# Patient Record
Sex: FEMALE | Race: BLACK OR AFRICAN AMERICAN | Employment: FULL TIME | URBAN - METROPOLITAN AREA
[De-identification: names, ages, dates, MRNs, and addresses within clinical notes are randomized per-mention and may not be internally consistent; named-entity substitution may affect disease eponyms.]

---

## 2019-12-24 ENCOUNTER — HOSPITAL ENCOUNTER (EMERGENCY)
Age: 59
Discharge: HOME | End: 2019-12-24
Attending: EMERGENCY MEDICINE
Payer: COMMERCIAL

## 2019-12-24 VITALS
BODY MASS INDEX: 29.44 KG/M2 | OXYGEN SATURATION: 96 % | DIASTOLIC BLOOD PRESSURE: 94 MMHG | HEART RATE: 86 BPM | WEIGHT: 160 LBS | TEMPERATURE: 98.5 F | SYSTOLIC BLOOD PRESSURE: 128 MMHG | RESPIRATION RATE: 18 BRPM | HEIGHT: 62 IN

## 2019-12-24 DIAGNOSIS — K04.7 DENTAL INFECTION: Primary | ICD-10-CM

## 2019-12-24 PROCEDURE — 74011250637 HC RX REV CODE- 250/637: Performed by: PHYSICIAN ASSISTANT

## 2019-12-24 PROCEDURE — 74011000250 HC RX REV CODE- 250: Performed by: PHYSICIAN ASSISTANT

## 2019-12-24 PROCEDURE — 99283 EMERGENCY DEPT VISIT LOW MDM: CPT

## 2019-12-24 RX ORDER — IBUPROFEN 800 MG/1
800 TABLET ORAL
Qty: 20 TAB | Refills: 0 | Status: SHIPPED | OUTPATIENT
Start: 2019-12-24 | End: 2019-12-31

## 2019-12-24 RX ORDER — CLINDAMYCIN HYDROCHLORIDE 150 MG/1
300 CAPSULE ORAL
Status: COMPLETED | OUTPATIENT
Start: 2019-12-24 | End: 2019-12-24

## 2019-12-24 RX ORDER — CLINDAMYCIN HYDROCHLORIDE 300 MG/1
300 CAPSULE ORAL 4 TIMES DAILY
Qty: 28 CAP | Refills: 0 | Status: SHIPPED | OUTPATIENT
Start: 2019-12-24 | End: 2019-12-31

## 2019-12-24 RX ORDER — HYDROCODONE BITARTRATE AND ACETAMINOPHEN 5; 325 MG/1; MG/1
1 TABLET ORAL
Qty: 10 TAB | Refills: 0 | Status: SHIPPED | OUTPATIENT
Start: 2019-12-24 | End: 2019-12-27

## 2019-12-24 RX ADMIN — DIPHENHYDRAMINE HYDROCHLORIDE: 12.5 LIQUID ORAL at 12:30

## 2019-12-24 RX ADMIN — CLINDAMYCIN HYDROCHLORIDE 300 MG: 150 CAPSULE ORAL at 12:54

## 2019-12-24 NOTE — ED NOTES
Pt presents ambulatory to ED complaining of upper left dental pain x1 week. Pt reports tooth broke and now left cheek is swollen. Pt reports taking dicolfenac and motrin for pain. Pt is alert and oriented x 4, RR even and unlabored, skin is warm and dry. Assesment completed and pt updated on plan of care. Emergency Department Nursing Plan of Care       The Nursing Plan of Care is developed from the Nursing assessment and Emergency Department Attending provider initial evaluation. The plan of care may be reviewed in the ED Provider note.     The Plan of Care was developed with the following considerations:   Patient / Family readiness to learn indicated by:verbalized understanding  Persons(s) to be included in education: patient  Barriers to Learning/Limitations:No    [address] 12/24/2019   12:23 PM

## 2019-12-24 NOTE — ED PROVIDER NOTES
EMERGENCY DEPARTMENT HISTORY AND PHYSICAL EXAM      Date: 12/24/2019  Patient Name: Ani Santamaria    History of Presenting Illness     HPI: Ani Santamaria is a 61 y.o. female with no significant past medical history presents to the emergency room for dental pain for 2 days. She reports that 2 days ago she fractured a tooth and says that she had intermittent mild pain but this morning she woke up with constant 9 out of 10 throbbing pain associated with left-sided facial swelling. She denies trismus, shortness of breath, drooling, voice change, among other associated symptoms. Pertinent social history: Current smoker, occasional drinker, denies street drug use    Pertinent surgical history: None    PCP: Jack, Not On File    Current Outpatient Medications   Medication Sig Dispense Refill    clindamycin (CLEOCIN) 300 mg capsule Take 1 Cap by mouth four (4) times daily for 7 days. 28 Cap 0    HYDROcodone-acetaminophen (NORCO) 5-325 mg per tablet Take 1 Tab by mouth every six (6) hours as needed for Pain for up to 3 days. Max Daily Amount: 4 Tabs. 10 Tab 0    ibuprofen (MOTRIN) 800 mg tablet Take 1 Tab by mouth every six (6) hours as needed for Pain for up to 7 days. 20 Tab 0       Past History     Past Medical History:  History reviewed. No pertinent past medical history. Past Surgical History:  History reviewed. No pertinent surgical history. Family History:  History reviewed. No pertinent family history. Social History:  Social History     Tobacco Use    Smoking status: Current Every Day Smoker    Smokeless tobacco: Never Used    Tobacco comment: 2-3 cigarettes daily   Substance Use Topics    Alcohol use: Yes     Comment: socially    Drug use: Not Currently       Allergies: Allergies   Allergen Reactions    Penicillins Swelling         Review of Systems   Review of Systems   Constitutional: Negative for chills and fever. HENT: Positive for congestion, dental problem and facial swelling. Negative for ear discharge, ear pain, sore throat, trouble swallowing and voice change. Respiratory: Negative for shortness of breath. Cardiovascular: Negative for chest pain. Gastrointestinal: Negative for nausea and vomiting. Neurological: Negative for light-headedness and headaches. Physical Exam     Vitals:    12/24/19 1202   BP: (!) 128/94   Pulse: 86   Resp: 18   Temp: 98.5 °F (36.9 °C)   SpO2: 96%   Weight: 72.6 kg (160 lb)   Height: 5' 2\" (1.575 m)     Physical Exam  Vitals signs and nursing note reviewed. Constitutional:       General: She is not in acute distress. Appearance: She is well-developed. She is not ill-appearing or diaphoretic. HENT:      Head: Normocephalic and atraumatic. Right Ear: Tympanic membrane, ear canal and external ear normal.      Left Ear: Tympanic membrane, ear canal and external ear normal.      Nose: Congestion present. Mouth/Throat:      Lips: No lesions. Mouth: Mucous membranes are moist.      Dentition: Abnormal dentition. Gingival swelling and dental caries present. Tongue: No lesions. Palate: No mass. Pharynx: Oropharynx is clear. Uvula midline. No pharyngeal swelling, oropharyngeal exudate or posterior oropharyngeal erythema. Tonsils: No tonsillar exudate or tonsillar abscesses. Neck:      Musculoskeletal: Normal range of motion and neck supple. Cardiovascular:      Rate and Rhythm: Normal rate and regular rhythm. Heart sounds: Normal heart sounds. Pulmonary:      Effort: Pulmonary effort is normal. No respiratory distress. Breath sounds: Normal breath sounds. No wheezing. Lymphadenopathy:      Cervical: No cervical adenopathy. Skin:     General: Skin is warm and dry. Coloration: Skin is not pale. Findings: No erythema or rash. Neurological:      Mental Status: She is alert and oriented to person, place, and time.    Psychiatric:         Behavior: Behavior normal.         Thought Content: Thought content normal.         Judgment: Judgment normal.           Diagnostic Study Results     Labs -   No results found for this or any previous visit (from the past 12 hour(s)). Radiologic Studies -   No orders to display     CT Results  (Last 48 hours)    None                Medical Decision Making   I am the first provider for this patient. I reviewed the vital signs, available nursing notes, past medical history, past surgical history, social history    ED Course and Progress notes:   Initial assessment performed. The patients presenting problems have been discussed, and they are in agreement with the care plan formulated and outlined with them. I have encouraged them to ask questions as they arise throughout their visit. on re evaluation pt is resting comfortably, and has no new complaints, changes, or physical findings. The patient has improved and is stable. Procedures:  Procedures    Critical Care Time: none    Vital Signs-Reviewed the patient's vital signs. Vitals:    12/24/19 1202   BP: (!) 128/94   BP 1 Location: Left arm   BP Patient Position: At rest   Pulse: 86   Resp: 18   Temp: 98.5 °F (36.9 °C)   SpO2: 96%   Weight: 72.6 kg (160 lb)   Height: 5' 2\" (1.575 m)       Medications Administered During ED Course  Medications   dental ball (lidocaine/Benadryl/Cetacaine) mixture ( Mucous Membrane Given 12/24/19 1230)   clindamycin (CLEOCIN) capsule 300 mg (300 mg Oral Given 12/24/19 1254)       Disposition:  D/c home    DISCHARGE NOTE:   The patient was counseled on diagnosis and care plan. All available lab and imaging results have been reviewed and were discussed with the patient, including all incidental findings. The likelihood of other entities in the differential is insufficient to justify any further testing for them. This was explained to the patient.   Patient agrees with plan and agrees to follow up with PCP as recommended, or return to the ED immediately if their symptoms worsen. All medications were reviewed with the patient. All of pt's questions and concerns were addressed. The patient was advised that new or worsening symptoms would require further evaluation and should prompt immediate return to the Emergency Department. Discharge instructions have been provided and explained to the patient, along with reasons to return to the ED. Patient voices understanding and is agreeable with the plan for discharge. Patient is ready to go home. Follow-up Information     Follow up With Specialties Details Why [address] [facility] Dentistry Schedule an appointment as soon as possible for a visit  [address]  [address] [facility] Emergency Medicine Go to If symptoms worsen [address]  Schedule an appointment as soon as possible for a visit  [address]  [phone]          Discharge Medication List as of 12/24/2019 12:49 PM      START taking these medications    Details   clindamycin (CLEOCIN) 300 mg capsule Take 1 Cap by mouth four (4) times daily for 7 days. , Print, Disp-28 Cap, R-0      HYDROcodone-acetaminophen (NORCO) 5-325 mg per tablet Take 1 Tab by mouth every six (6) hours as needed for Pain for up to 3 days. Max Daily Amount: 4 Tabs., Print, Disp-10 Tab, R-0      ibuprofen (MOTRIN) 800 mg tablet Take 1 Tab by mouth every six (6) hours as needed for Pain for up to 7 days. , Print, Disp-20 Tab, R-0         STOP taking these medications       DICLOFENAC SODIUM PO Comments:   Reason for Stopping:               Provider Notes (Medical Decision Making):   DDX: gingivitis, abscess, poor oral hygiene, fractured tooth, dentalgia, No red flags that make peritonsillar/retropharyngeal abscess, ludwigs angina, carotid artery dissection concerning. Diagnosis     Clinical Impression:   1.  Dental infection Please note that this dictation was completed with [email or web address], the computer voice recognition software. Quite often unanticipated grammatical, syntax, homophones, and other interpretive errors are inadvertently transcribed by the computer software. Please disregard these errors. Please excuse any errors that have escaped final proofreading. This note will not be viewable in [address]

## 2019-12-24 NOTE — ED TRIAGE NOTES
Pt c/o L upper dental pain x few weeks, worsening last night. Pt cheek slightly swollen. Pt is concerned and would like anitbiotics Pt has been taking ibuprofen and diclofenac for pain.

## 2019-12-24 NOTE — ED NOTES
Discharge instructions were given to the patient by Julianna Jett.     The patient left the Emergency Department ambulatory, alert and oriented and in no acute distress with 3 prescriptions. The patient was encouraged to call or return to the ED for worsening issues or problems and was encouraged to schedule a follow up appointment for continuing care. The patient verbalized understanding of discharge instructions and prescriptions, all questions were answered.  The patient has no further concerns at this time.      '

## 2019-12-24 NOTE — LETTER
[facility] EMERGENCY DEPT 
[address] [address] 
[phone] Work/School Note Date: 12/24/2019 To Whom It May concern: 
 
Jean Fontenot was seen and treated today in the emergency room by the following provider(s): 
Attending Provider: Maine Christie MD 
Physician Assistant: Priscilla Ortiz, [facility]. Jean Fontenot may return to work on Saturday Sincerely, 
 
 
 
 
JULIA Calixto

## 2019-12-24 NOTE — DISCHARGE INSTRUCTIONS
Patient Education        Abscessed Tooth: Care Instructions  Your Care Instructions    An abscessed tooth is a tooth that has a pocket of pus in the tissues around it. Pus forms when the body tries to fight an infection caused by bacteria. If the pus cannot drain, it forms an abscess. An abscessed tooth can cause red, swollen gums and throbbing pain, especially when you chew. You may have a bad taste in your mouth and a fever, and your jaw may swell. Damage to the tooth, untreated tooth decay, or gum disease can cause an abscessed tooth. An abscessed tooth needs to be treated by a dental professional right away. If it is not treated, the infection could spread to other parts of your body. Your dentist will give you antibiotics to stop the infection. He or she may make a hole in the tooth or cut open (aki) the abscess inside your mouth so that the infection can drain, which should relieve your pain. You may need to have a root canal treatment, which tries to save your tooth by taking out the infected pulp and replacing it with a healing medicine and/or a filling. If these treatments do not work, your tooth may have to be removed. Follow-up care is a key part of your treatment and safety. Be sure to make and go to all appointments, and call your doctor if you are having problems. It's also a good idea to know your test results and keep a list of the medicines you take. How can you care for yourself at home? · Reduce pain and swelling in your face and jaw by putting ice or a cold pack on the outside of your cheek for 10 to 20 minutes at a time. Put a thin cloth between the ice and your skin. · Take pain medicines exactly as directed. ? If the doctor gave you a prescription medicine for pain, take it as prescribed. ? If you are not taking a prescription pain medicine, ask your doctor if you can take an over-the-counter medicine. · Take your antibiotics as directed.  Do not stop taking them just because you feel better. You need to take the full course of antibiotics. To prevent tooth abscess  · Brush and floss every day, and have regular dental checkups. · Eat a healthy diet, and avoid sugary foods and drinks. · Do not smoke, use e-cigarettes with nicotine, or use spit tobacco. Tobacco and nicotine slow your ability to heal. Tobacco also increases your risk for gum disease and cancer of the mouth and throat. If you need help quitting, talk to your doctor about stop-smoking programs and medicines. These can increase your chances of quitting for good. When should you call for help? Call 911 anytime you think you may need emergency care. For example, call if:    · You have trouble breathing.    Call your doctor now or seek immediate medical care if:    · You have new or worse symptoms of infection, such as:  ? Increased pain, swelling, warmth, or redness. ? Red streaks leading from the area. ? Pus draining from the area. ? A fever.    Watch closely for changes in your health, and be sure to contact your doctor if:    · You do not get better as expected. Where can you learn more? Go to http://pepito-julius.info/. Enter E779 in the search box to learn more about \"Abscessed Tooth: Care Instructions. \"  Current as of: October 3, 2018  Content Version: 12.2  © 6279-4727 [employer]. Care instructions adapted under license by [employer] (which disclaims liability or warranty for this information). If you have questions about a medical condition or this instruction, always ask your healthcare professional. [place] [address] any warranty or liability for your use of this information.

## 2020-08-03 ENCOUNTER — HOSPITAL ENCOUNTER (EMERGENCY)
Age: 60
Discharge: HOME | End: 2020-08-03
Attending: EMERGENCY MEDICINE | Admitting: EMERGENCY MEDICINE
Payer: COMMERCIAL

## 2020-08-03 ENCOUNTER — APPOINTMENT (OUTPATIENT)
Dept: CT IMAGING | Age: 60
End: 2020-08-03
Attending: EMERGENCY MEDICINE
Payer: COMMERCIAL

## 2020-08-03 VITALS
BODY MASS INDEX: 29.44 KG/M2 | HEART RATE: 69 BPM | DIASTOLIC BLOOD PRESSURE: 62 MMHG | TEMPERATURE: 98.2 F | WEIGHT: 160 LBS | HEIGHT: 62 IN | SYSTOLIC BLOOD PRESSURE: 151 MMHG | RESPIRATION RATE: 18 BRPM | OXYGEN SATURATION: 97 %

## 2020-08-03 DIAGNOSIS — M70.61 TROCHANTERIC BURSITIS OF RIGHT HIP: Primary | ICD-10-CM

## 2020-08-03 DIAGNOSIS — T14.8XXA MUSCULOSKELETAL STRAIN: ICD-10-CM

## 2020-08-03 LAB
APPEARANCE UR: CLEAR
BACTERIA URNS QL MICRO: NEGATIVE /HPF
BILIRUB UR QL: NEGATIVE
COLOR UR: ABNORMAL
EPITH CASTS URNS QL MICRO: ABNORMAL /LPF
GLUCOSE UR STRIP.AUTO-MCNC: NEGATIVE MG/DL
HGB UR QL STRIP: ABNORMAL
KETONES UR QL STRIP.AUTO: NEGATIVE MG/DL
LEUKOCYTE ESTERASE UR QL STRIP.AUTO: ABNORMAL
NITRITE UR QL STRIP.AUTO: NEGATIVE
PH UR STRIP: 6 [PH] (ref 5–8)
PROT UR STRIP-MCNC: NEGATIVE MG/DL
RBC #/AREA URNS HPF: ABNORMAL /HPF (ref 0–5)
SP GR UR REFRACTOMETRY: 1.02 (ref 1–1.03)
UA: UC IF INDICATED,UAUC: ABNORMAL
UROBILINOGEN UR QL STRIP.AUTO: 1 EU/DL (ref 0.2–1)
WBC URNS QL MICRO: ABNORMAL /HPF (ref 0–4)

## 2020-08-03 PROCEDURE — 99283 EMERGENCY DEPT VISIT LOW MDM: CPT

## 2020-08-03 PROCEDURE — 74011250637 HC RX REV CODE- 250/637: Performed by: EMERGENCY MEDICINE

## 2020-08-03 PROCEDURE — 81001 URINALYSIS AUTO W/SCOPE: CPT

## 2020-08-03 PROCEDURE — 74176 CT ABD & PELVIS W/O CONTRAST: CPT

## 2020-08-03 RX ORDER — METHOCARBAMOL 500 MG/1
500 TABLET, FILM COATED ORAL ONCE
Status: COMPLETED | OUTPATIENT
Start: 2020-08-03 | End: 2020-08-03

## 2020-08-03 RX ORDER — METHOCARBAMOL 750 MG/1
750 TABLET, FILM COATED ORAL 4 TIMES DAILY
Qty: 20 TAB | Refills: 0 | Status: SHIPPED | OUTPATIENT
Start: 2020-08-03

## 2020-08-03 RX ORDER — KETOROLAC TROMETHAMINE 30 MG/ML
30 INJECTION, SOLUTION INTRAMUSCULAR; INTRAVENOUS
Status: DISCONTINUED | OUTPATIENT
Start: 2020-08-03 | End: 2020-08-03

## 2020-08-03 RX ORDER — NAPROXEN 500 MG/1
500 TABLET ORAL
Qty: 20 TAB | Refills: 0 | OUTPATIENT
Start: 2020-08-03 | End: 2020-08-07

## 2020-08-03 RX ADMIN — METHOCARBAMOL 500 MG: 500 TABLET ORAL at 12:10

## 2020-08-03 NOTE — DISCHARGE INSTRUCTIONS
Patient Education        Bursitis: Care Instructions  Your Care Instructions     A bursa is a small sac of fluid that helps the tissues around a joint slide over one another easily. Injury or overuse of a joint can cause pain, redness, and inflammation in the bursa (bursitis). Bursitis usually gets better if you avoid the activity that caused it. You can help prevent bursitis from coming back by doing stretching and strengthening exercises. You may also need to change the way you do some activities. Follow-up care is a key part of your treatment and safety. Be sure to make and go to all appointments, and call your doctor if you are having problems. It's also a good idea to know your test results and keep a list of the medicines you take. How can you care for yourself at home? · Put ice or a cold pack on the area for 10 to 20 minutes at a time. Try to do this every 1 to 2 hours for the next 3 days (when you are awake) or until the swelling goes down. Put a thin cloth between the ice and your skin. · After the 3 days of using ice, you may use heat on the area. You can use a hot water bottle; a warm, moist towel; or a heating pad set on low. You can also try alternating heat and ice. · Rest the area where you have pain. Stop any activities that cause pain. Switch to activities that do not stress the area. · Take pain medicines exactly as directed. ? If the doctor gave you a prescription medicine for pain, take it as prescribed. ? If you are not taking a prescription pain medicine, ask your doctor if you can take an over-the-counter medicine. ? Do not take two or more pain medicines at the same time unless the doctor told you to. Many pain medicines have acetaminophen, which is Tylenol. Too much acetaminophen (Tylenol) can be harmful. · To prevent stiffness, gently move the joint as much as you can without pain every day. As the pain gets better, keep doing range-of-motion exercises.  Ask your doctor for exercises that will make the muscles around the joint stronger. Do these as directed. · You can slowly return to the activity that caused the pain, but do it with less effort until you can do it without pain or swelling. Be sure to warm up before and stretch after you do the activity. When should you call for help? Call your doctor now or seek immediate medical care if:  · You have new or worse symptoms of infection, such as:  ? Increased pain, swelling, warmth, or redness. ? Red streaks leading from the area. ? Pus draining from the area. ? A fever. Watch closely for changes in your health, and be sure to contact your doctor if:  · You do not get better as expected. Where can you learn more? Go to http://pepito-julius.info/  Enter Q970 in the search box to learn more about \"Bursitis: Care Instructions. \"  Current as of: March 2, 2020               Content Version: 12.5  © 2006-2020 [email or web address]. Care instructions adapted under license by [employer] (which disclaims liability or warranty for this information). If you have questions about a medical condition or this instruction, always ask your healthcare professional. [place] [address] any warranty or liability for your use of this information. Patient Education        Muscle Strain: Care Instructions  Your Care Instructions     A muscle strain happens when you overstretch, or pull, a muscle. It can happen when you exercise or lift something or when you have an accident. Rest and other home care can help the muscle heal.  Follow-up care is a key part of your treatment and safety. Be sure to make and go to all appointments, and call your doctor if you are having problems. It's also a good idea to know your test results and keep a list of the medicines you take. How can you care for yourself at home? · Rest the strained muscle. Do not put weight on it for a day or two.  If your doctor advises you to, use crutches or a sling to rest a sore limb. · Put ice or a cold pack on the sore muscle for 10 to 20 minutes at a time to stop swelling. Put a thin cloth between the ice pack and your skin. · Prop up the sore arm or leg on a pillow when you ice it or anytime you sit or lie down during the next 3 days. Try to keep it above the level of your heart. This will help reduce swelling. · Take pain medicines exactly as directed. ? If the doctor gave you a prescription medicine for pain, take it as prescribed. ? If you are not taking a prescription pain medicine, ask your doctor if you can take an over-the-counter medicine. · Do not do anything that makes the pain worse. Return to exercise gradually as you feel better. When should you call for help? Call your doctor now or seek immediate medical care if:  · You have new severe pain. · Your injured limb is cool or pale or changes color. · You have tingling, weakness, or numbness in your injured limb. · You cannot move the injured area. Watch closely for changes in your health, and be sure to contact your doctor if:  · You cannot put weight on a joint, or it feels unsteady when you walk. · Pain and swelling get worse or do not start to get better after 2 days of home treatment. Where can you learn more? Go to http://pepito-julius.info/  Enter Y574 in the search box to learn more about \"Muscle Strain: Care Instructions. \"  Current as of: March 2, 2020               Content Version: 12.5  © 6062-8071 [employer]. Care instructions adapted under license by [employer] (which disclaims liability or warranty for this information). If you have questions about a medical condition or this instruction, always ask your healthcare professional. [place] [address] any warranty or liability for your use of this information.          Patient Education        Trochanteric Bursitis: Exercises  Introduction  Here are some examples of exercises for you to try. The exercises may be suggested for a condition or for rehabilitation. Start each exercise slowly. Ease off the exercises if you start to have pain. You will be told when to start these exercises and which ones will work best for you. How to do the exercises  Hamstring wall stretch   1. Lie on your back in a doorway, with your good leg through the open door. 2. Slide your affected leg up the wall to straighten your knee. You should feel a gentle stretch down the back of your leg. 3. Hold the stretch for at least 1 minute to begin. Then try to lengthen the time you hold the stretch to as long as 6 minutes. 4. Repeat 2 to 4 times. 5. If you do not have a place to do this exercise in a doorway, there is another way to do it:  6. Lie on your back, and bend the knee of your affected leg. 7. Loop a towel under the ball and toes of that foot, and hold the ends of the towel in your hands. 8. Straighten your knee, and slowly pull back on the towel. You should feel a gentle stretch down the back of your leg. 9. Hold the stretch for 15 to 30 seconds. Or even better, hold the stretch for 1 minute if you can. 10. Repeat 2 to 4 times. 1. Do not arch your back. 2. Do not bend either knee. 3. Keep one heel touching the floor and the other heel touching the wall. Do not point your toes. Straight-leg raises to the outside   1. Lie on your side, with your affected leg on top. 2. Tighten the front thigh muscles of your top leg to keep your knee straight. 3. Keep your hip and your leg straight in line with the rest of your body, and keep your knee pointing forward. Do not drop your hip back. 4. Lift your top leg straight up toward the ceiling, about 12 inches off the floor. Hold for about 6 seconds, then slowly lower your leg. 5. Repeat 8 to 12 times. Clamshell   1.  Lie on your side, with your affected leg on top and your head propped on a pillow. Keep your feet and knees together and your knees bent. 2. Raise your top knee, but keep your feet together. Do not let your hips roll back. Your legs should open up like a clamshell. 3. Hold for 6 seconds. 4. Slowly lower your knee back down. Rest for 10 seconds. 5. Repeat 8 to 12 times. Standing quadriceps stretch   1. If you are not steady on your feet, hold on to a chair, counter, or wall. You can also lie on your stomach or your side to do this exercise. 2. Bend the knee of the leg you want to stretch, and reach behind you to grab the front of your foot or ankle with the hand on the same side. For example, if you are stretching your right leg, use your right hand. 3. Keeping your knees next to each other, pull your foot toward your buttock until you feel a gentle stretch across the front of your hip and down the front of your thigh. Your knee should be pointed directly to the ground, and not out to the side. 4. Hold the stretch for 15 to 30 seconds. 5. Repeat 2 to 4 times. Piriformis stretch   1. Lie on your back with your legs straight. 2. Lift your affected leg and bend your knee. With your opposite hand, reach across your body, and then gently pull your knee toward your opposite shoulder. 3. Hold the stretch for 15 to 30 seconds. 4. Repeat 2 to 4 times. Double knee-to-chest   1. Lie on your back with your knees bent and your feet flat on the floor. You can put a small pillow under your head and neck if it is more comfortable. 2. Bring both knees to your chest.  3. Keep your lower back pressed to the floor. Hold for 15 to 30 seconds. 4. Relax, and lower your knees to the starting position. 5. Repeat 2 to 4 times. Follow-up care is a key part of your treatment and safety. Be sure to make and go to all appointments, and call your doctor if you are having problems. It's also a good idea to know your test results and keep a list of the medicines you take.   Where can you learn more?  Go to http://pepito-julius.info/  Enter N503 in the search box to learn more about \"Trochanteric Bursitis: Exercises. \"  Current as of: March 2, 2020               Content Version: 12.5  © 1607-3020 [employer]. Care instructions adapted under license by [employer] (which disclaims liability or warranty for this information). If you have questions about a medical condition or this instruction, always ask your healthcare professional. [place] [address] any warranty or liability for your use of this information.

## 2020-08-03 NOTE — ED TRIAGE NOTES
PT reports right hip pain and side pain x4 days. Pt reports taking aleve and using a pain relief cream. Pt denies any injury or trauma. Pt denies any urinary symptoms.

## 2020-08-03 NOTE — ED PROVIDER NOTES
EMERGENCY DEPARTMENT HISTORY AND PHYSICAL EXAM      Date: 8/3/2020  Patient Name: Vera Judge    History of Presenting Illness     Chief Complaint   Patient presents with    Hip Pain     History Provided By: Patient    HPI: Vera Judge, 61 y.o. female with no significant past medical history who presents via private vehicle to the ED with cc of right hip pain for the past 3 days. Patient denies any trauma or known injury. Her pain is located along her right flank and right hip and worse with twisting, movements, or any change in position. She describes the pain as a sharp intermittent pain that she has never had before. She has tried taking Aleve, BC arthritis, and using BenGay with no relief of symptoms. So far today, she has taking 4 Aleve's and 2 BC arthritis tablets. She denies any hematuria, dysuria, or any other symptoms. PMHx: None  Social Hx: Smokes 1/4 pack/day, occasional alcohol use, denies illegal drug use    PCP: Jack, Not On File    There are no other complaints, changes, or physical findings at this time. No current facility-administered medications on file prior to encounter. No current outpatient medications on file prior to encounter. Past History     Past Medical History:  History reviewed. No pertinent past medical history. Past Surgical History:  History reviewed. No pertinent surgical history. Family History:  History reviewed. No pertinent family history. Social History:  Social History     Tobacco Use    Smoking status: Current Every Day Smoker    Smokeless tobacco: Never Used    Tobacco comment: 2-3 cigarettes daily   Substance Use Topics    Alcohol use: Yes     Comment: socially    Drug use: Not Currently     Allergies: Allergies   Allergen Reactions    Penicillins Swelling     Review of Systems   Review of Systems   Constitutional: Negative for chills and fever. HENT: Negative for congestion, rhinorrhea, sneezing and sore throat.     Eyes: Negative for redness and visual disturbance. Respiratory: Negative for shortness of breath. Cardiovascular: Negative for leg swelling. Gastrointestinal: Negative for abdominal pain, nausea and vomiting. Genitourinary: Negative for difficulty urinating and frequency. Musculoskeletal: Positive for arthralgias and myalgias. Negative for back pain and neck stiffness. Skin: Negative for rash. Neurological: Negative for dizziness, syncope, weakness and headaches. Hematological: Negative for adenopathy. All other systems reviewed and are negative. Physical Exam   Physical Exam  Vitals signs and nursing note reviewed. Constitutional:       Appearance: Normal appearance. She is well-developed. HENT:      Head: Normocephalic and atraumatic. Eyes:      Conjunctiva/sclera: Conjunctivae normal.   Neck:      Musculoskeletal: Full passive range of motion without pain, normal range of motion and neck supple. Cardiovascular:      Rate and Rhythm: Normal rate and regular rhythm. Pulses: Normal pulses. Heart sounds: Normal heart sounds, S1 normal and S2 normal. No murmur. Pulmonary:      Effort: Pulmonary effort is normal. No respiratory distress. Breath sounds: Normal breath sounds. No wheezing. Abdominal:      General: Bowel sounds are normal. There is no distension. Palpations: Abdomen is soft. Tenderness: There is no abdominal tenderness. There is no right CVA tenderness, left CVA tenderness or rebound. Musculoskeletal: Normal range of motion. Legs:    Skin:     General: Skin is warm and dry. Findings: No rash. Neurological:      Mental Status: She is alert and oriented to person, place, and time. Psychiatric:         Speech: Speech normal.         Behavior: Behavior normal.         Thought Content:  Thought content normal.         Judgment: Judgment normal.       Diagnostic Study Results   Labs -     Recent Results (from the past 12 hour(s))   URINALYSIS W/ REFLEX CULTURE    Collection Time: 08/03/20 11:05 AM    Specimen: Urine   Result Value Ref Range    Color YELLOW/STRAW      Appearance CLEAR CLEAR      Specific gravity 1.020 1.003 - 1.030      pH (UA) 6.0 5.0 - 8.0      Protein Negative NEG mg/dL    Glucose Negative NEG mg/dL    Ketone Negative NEG mg/dL    Bilirubin Negative NEG      Blood MODERATE (A) NEG      Urobilinogen 1.0 0.2 - 1.0 EU/dL    Nitrites Negative NEG      Leukocyte Esterase TRACE (A) NEG      WBC 0-4 0 - 4 /hpf    RBC 10-20 0 - 5 /hpf    Epithelial cells FEW FEW /lpf    Bacteria Negative NEG /hpf    UA:UC IF INDICATED CULTURE NOT INDICATED BY UA RESULT CNI         Radiologic Studies -   CT ABD PELV WO CONT   Final Result   IMPRESSION:   1. No evidence of urolithiasis or hydronephrotic change. No renal mass lesions   or fluid collections identified. 2. Normal appearance of the urinary bladder. 3. Presence of multiple calcifications in the lower pelvic region suggestive of   phleboliths. 4. Evidence of colonic diverticulosis. 5. Evidence of spondylosis involving the thoracolumbar spine. Ct Abd Pelv Wo Cont    Result Date: 8/3/2020  IMPRESSION: 1. No evidence of urolithiasis or hydronephrotic change. No renal mass lesions or fluid collections identified. 2. Normal appearance of the urinary bladder. 3. Presence of multiple calcifications in the lower pelvic region suggestive of phleboliths. 4. Evidence of colonic diverticulosis. 5. Evidence of spondylosis involving the thoracolumbar spine. Medical Decision Making   I am the first provider for this patient. I reviewed the vital signs, available nursing notes, past medical history, past surgical history, family history and social history. Vital Signs-Reviewed the patient's vital signs.   Patient Vitals for the past 24 hrs:   Temp Pulse Resp BP SpO2   08/03/20 1026 98.2 °F (36.8 °C) 69 18 151/62 97 %     Pulse Oximetry Analysis - 97% on RA    Records Reviewed: Nursing Notes    Provider Notes (Medical Decision Making):   77-year-old female presents with atraumatic right hip and flank pain that started 3 days ago. Differential includes trochanteric bursitis, musculoskeletal strain, kidney stone, and low suspicion for pyelonephritis or UTI. Also low concern suspicion for ovarian cyst.  She has already taken multiple doses of NSAIDs today so I will treat her with a muscle relaxer and check a urine. If she has hematuria, will CT her abdomen pelvis to assess for ureteral stone. ED Course:   Initial assessment performed. The patients presenting problems have been discussed, and they are in agreement with the care plan formulated and outlined with them. I have encouraged them to ask questions as they arise throughout their visit. Patient's UA does show hematuria, but no sign of stone on her CT abdomen/pelvis. Suspect this is osteoarthritis versus trochanteric bursitis. Will treat with NSAIDs and a muscle relaxer and have patient follow-up with her primary care doctor and outpatient orthopedics. Progress Note:   Updated pt on all returned results and findings. Discussed the importance of proper follow up as referred below along with return precautions. Pt in agreement with the care plan and expresses agreement with and understanding of all items discussed. Disposition:  Discharge Note:  The pt is ready for discharge. The pt's signs, symptoms, diagnosis, and discharge instructions have been discussed and pt has conveyed their understanding. The pt is to follow up as recommended or return to ER should their symptoms worsen. Plan has been discussed and pt is in agreement. PLAN:  1. Current Discharge Medication List      START taking these medications    Details   naproxen (NAPROSYN) 500 mg tablet Take 1 Tab by mouth every twelve (12) hours as needed for Pain. Qty: 20 Tab, Refills: 0      methocarbamoL (ROBAXIN) 750 mg tablet Take 1 Tab by mouth four (4) times daily.   Qty: 20 Tab, Refills: 0           2. Follow-up Information     Follow up With Specialties Details Why [address]  Call to arrange primary care [address]  [place], [address] [address]    Sara [place],  Orthopedic Surgery Schedule an appointment as soon as possible for a visit  [address] [facility]  [address] [address]  [phone]      [facility] EMERGENCY DEPT Emergency Medicine  As needed, If symptoms worsen Burton Dumont  [phone]        Return to ED if worse     Diagnosis     Clinical Impression:   1. Trochanteric bursitis of right hip    2. Musculoskeletal strain            Please note that this dictation was completed with Dragon, computer voice recognition software. Quite often unanticipated grammatical, syntax, homophones, and other interpretive errors are inadvertently transcribed by the computer software. Please disregard these errors. Additionally, please excuse any errors that have escaped final proofreading.

## 2020-08-03 NOTE — LETTER
[facility] EMERGENCY DEPT 
[address] [facility] [address] 
[phone] Work/School Note Date: 8/3/2020 To Whom It May concern: 
 
Romy Mejia was seen and treated today in the emergency room by the following provider(s): 
Attending Provider: Carrie Jose MD. Romy Mejia may return to work on 8/5/2020 or earlier if feeling better. Sincerely, Bethany Booker RN

## 2020-08-03 NOTE — ED NOTES
Pt presents ambulatory to ED complaining of right hip pain beginning Friday. Pt denies injury/trauma. Pt reports taking two Aleve at 0500 with BC arthritis, another two Aleve with BC arthritis one hour PTA. Pt is alert and oriented x 4, RR even and unlabored, skin is warm and dry. Assesment completed and pt updated on plan of care. Emergency Department Nursing Plan of Care       The Nursing Plan of Care is developed from the Nursing assessment and Emergency Department Attending provider initial evaluation. The plan of care may be reviewed in the ED Provider note.     The Plan of Care was developed with the following considerations:   Patient / Family readiness to learn indicated by:verbalized understanding  Persons(s) to be included in education: patient  Barriers to Learning/Limitations:No    [address]    8/3/2020   10:49 AM

## 2020-08-03 NOTE — ED NOTES
Discharge instructions were given to the patient by Tai Soto RN. The patient left the Emergency Department ambulatory, alert and oriented and in no acute distress with 2 prescriptions and a note. The patient was encouraged to call or return to the ED for worsening issues or problems and was encouraged to schedule a follow up appointment for continuing care. The patient verbalized understanding of discharge instructions and prescriptions, all questions were answered. The patient has no further concerns at this time.

## 2020-08-03 NOTE — ED NOTES
Verbal shift change report given to Yoana Negron RN (oncoming nurse) by Dakota Miller RN (offgoing nurse). Report included the following information SBAR, ED Summary, MAR and Recent Results.

## 2020-08-03 NOTE — ED NOTES
Bedside and Verbal shift change report given to Yancy Calixto RN (oncoming nurse) by Freddy Ferris RN (offgoing nurse). Report included the following information SBAR. Assumed pt care at this time. Pt resting in position of comfort with call bell in reach and no questions/concerns at this time. Will continue to monitor.      Per MD, toradol held because pt self medicated PTA

## 2020-08-04 DIAGNOSIS — M25.551 RIGHT HIP PAIN: Primary | ICD-10-CM

## 2020-08-07 ENCOUNTER — HOSPITAL ENCOUNTER (EMERGENCY)
Age: 60
Discharge: HOME | End: 2020-08-07
Attending: EMERGENCY MEDICINE | Admitting: EMERGENCY MEDICINE
Payer: COMMERCIAL

## 2020-08-07 VITALS
HEIGHT: 62 IN | HEART RATE: 74 BPM | BODY MASS INDEX: 29.44 KG/M2 | DIASTOLIC BLOOD PRESSURE: 67 MMHG | SYSTOLIC BLOOD PRESSURE: 118 MMHG | RESPIRATION RATE: 18 BRPM | TEMPERATURE: 98.4 F | WEIGHT: 160 LBS | OXYGEN SATURATION: 100 %

## 2020-08-07 DIAGNOSIS — M25.559 HIP PAIN: Primary | ICD-10-CM

## 2020-08-07 PROCEDURE — 74011250636 HC RX REV CODE- 250/636: Performed by: NURSE PRACTITIONER

## 2020-08-07 PROCEDURE — 99284 EMERGENCY DEPT VISIT MOD MDM: CPT

## 2020-08-07 PROCEDURE — 96372 THER/PROPH/DIAG INJ SC/IM: CPT

## 2020-08-07 RX ORDER — KETOROLAC TROMETHAMINE 30 MG/ML
60 INJECTION, SOLUTION INTRAMUSCULAR; INTRAVENOUS
Status: COMPLETED | OUTPATIENT
Start: 2020-08-07 | End: 2020-08-07

## 2020-08-07 RX ORDER — DIFLUNISAL 500 MG/1
500 TABLET, FILM COATED ORAL 2 TIMES DAILY
Qty: 20 TAB | Refills: 0 | Status: SHIPPED | OUTPATIENT
Start: 2020-08-07 | End: 2020-08-12 | Stop reason: ALTCHOICE

## 2020-08-07 RX ADMIN — KETOROLAC TROMETHAMINE 60 MG: 30 INJECTION, SOLUTION INTRAMUSCULAR; INTRAVENOUS at 17:51

## 2020-08-07 NOTE — LETTER
[facility] EMERGENCY DEPT 
[address] [address] 
[phone] Work/School Note Date: 8/7/2020 To Whom It May concern: 
 
Meagan Hernadez was seen and treated today in the emergency room by the following provider(s): 
Nurse Practitioner: Hira Urban NP. Meagan Hernadez may return to work on august 12. Sincerely, Katharine Jenkins NP
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
## 2020-08-07 NOTE — DISCHARGE INSTRUCTIONS
Patient Education        Hip Pain: Care Instructions  Your Care Instructions     Hip pain may be caused by many things, including overuse, a fall, or a twisting movement. Another cause of hip pain is arthritis. Your pain may increase when you stand up, walk, or squat. The pain may come and go or may be constant. Home treatment can help relieve hip pain, swelling, and stiffness. If your pain is ongoing, you may need more tests and treatment. Follow-up care is a key part of your treatment and safety. Be sure to make and go to all appointments, and call your doctor if you are having problems. It's also a good idea to know your test results and keep a list of the medicines you take. How can you care for yourself at home? · Take pain medicines exactly as directed. ? If the doctor gave you a prescription medicine for pain, take it as prescribed. ? If you are not taking a prescription pain medicine, ask your doctor if you can take an over-the-counter medicine. · Rest and protect your hip. Take a break from any activity, including standing or walking, that may cause pain. · Put ice or a cold pack against your hip for 10 to 20 minutes at a time. Try to do this every 1 to 2 hours for the next 3 days (when you are awake) or until the swelling goes down. Put a thin cloth between the ice and your skin. · Sleep on your healthy side with a pillow between your knees, or sleep on your back with pillows under your knees. · If there is no swelling, you can put moist heat, a heating pad, or a warm cloth on your hip. Do gentle stretching exercises to help keep your hip flexible. · Learn how to prevent falls. Have your vision and hearing checked regularly. Wear slippers or shoes with a nonskid sole. · Stay at a healthy weight. · Wear comfortable shoes. When should you call for help? [id number] anytime you think you may need emergency care.  For example, call if:  · You have sudden chest pain and shortness of breath, or you cough up blood. · You are not able to stand or walk or bear weight. · Your buttocks, legs, or feet feel numb or tingly. · Your leg or foot is cool or pale or changes color. · You have severe pain. Call your doctor now or seek immediate medical care if:  · You have signs of infection, such as:  ? Increased pain, swelling, warmth, or redness in the hip area. ? Red streaks leading from the hip area. ? Pus draining from the hip area. ? A fever. · You have signs of a blood clot, such as:  ? Pain in your calf, back of the knee, thigh, or groin. ? Redness and swelling in your leg or groin. · You are not able to bend, straighten, or move your leg normally. · You have trouble urinating or having bowel movements. Watch closely for changes in your health, and be sure to contact your doctor if:  · You do not get better as expected. Where can you learn more? Go to http://www.gray.com/  Enter Z720 in the search box to learn more about \"Hip Pain: Care Instructions. \"  Current as of: June 26, 2019               Content Version: 12.5  © 9538-1649 [employer]. Care instructions adapted under license by [employer] (which disclaims liability or warranty for this information). If you have questions about a medical condition or this instruction, always ask your healthcare professional. [place] [address] any warranty or liability for your use of this information.

## 2020-08-07 NOTE — ED NOTES
Yen Villarreal NP at bedside reviewing patient's discharge instructions and reviewing medications. Patient ambulatory home. Patient in no apparent distress. RN did not reevaluate pain.
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
## 2020-08-12 ENCOUNTER — OFFICE VISIT (OUTPATIENT)
Dept: ORTHOPEDIC SURGERY | Age: 60
End: 2020-08-12
Payer: COMMERCIAL

## 2020-08-12 ENCOUNTER — HOSPITAL ENCOUNTER (OUTPATIENT)
Dept: GENERAL RADIOLOGY | Age: 60
Discharge: HOME | End: 2020-08-12
Payer: COMMERCIAL

## 2020-08-12 VITALS
OXYGEN SATURATION: 99 % | HEART RATE: 79 BPM | SYSTOLIC BLOOD PRESSURE: 115 MMHG | DIASTOLIC BLOOD PRESSURE: 57 MMHG | BODY MASS INDEX: 30.91 KG/M2 | HEIGHT: 62 IN | WEIGHT: 168 LBS | TEMPERATURE: 97.2 F

## 2020-08-12 DIAGNOSIS — M25.551 RIGHT HIP PAIN: ICD-10-CM

## 2020-08-12 DIAGNOSIS — M70.61 TROCHANTERIC BURSITIS, RIGHT HIP: Primary | ICD-10-CM

## 2020-08-12 PROCEDURE — 20610 DRAIN/INJ JOINT/BURSA W/O US: CPT | Performed by: ORTHOPAEDIC SURGERY

## 2020-08-12 PROCEDURE — 73502 X-RAY EXAM HIP UNI 2-3 VIEWS: CPT

## 2020-08-12 PROCEDURE — 99203 OFFICE O/P NEW LOW 30 MIN: CPT | Performed by: ORTHOPAEDIC SURGERY

## 2020-08-12 RX ORDER — DIFLUNISAL 500 MG/1
500 TABLET, FILM COATED ORAL 2 TIMES DAILY
Qty: 60 TAB | Refills: 0 | Status: SHIPPED | OUTPATIENT
Start: 2020-08-12

## 2020-08-12 RX ORDER — BETAMETHASONE SODIUM PHOSPHATE AND BETAMETHASONE ACETATE 3; 3 MG/ML; MG/ML
6 INJECTION, SUSPENSION INTRA-ARTICULAR; INTRALESIONAL; INTRAMUSCULAR; SOFT TISSUE ONCE
Qty: 1 ML | Refills: 0
Start: 2020-08-12 | End: 2020-08-12

## 2020-08-12 NOTE — PROGRESS NOTES
8/12/2020    Chief Complaint: Right hip pain    HPI: This is a 61year old female with history at fall from work who  complains of right hip pain which is activity dependent. The patient has had activity dependent pain for weeks. The pain is located in the lateral aspect of the hip, it radiates somewhat distally and laterally, it is severe in intensity. The patient complains of difficulty sleeping on the right side, limitation in the normal activities of daily living. The patient has tried activity modification, NSAIDs and other over the counter pain medications, No physical therapy, injections have not been attempted. no other surgery or pertinent history to this hip. History reviewed. No pertinent past medical history. History reviewed. No pertinent surgical history. Current Outpatient Medications on File Prior to Visit   Medication Sig Dispense Refill    methocarbamoL (ROBAXIN) 750 mg tablet Take 1 Tab by mouth four (4) times daily. 20 Tab 0     No current facility-administered medications on file prior to visit. Allergies   Allergen Reactions    Penicillins Swelling       No family history on file.     Social History     Socioeconomic History    Marital status: SINGLE     Spouse name: Not on file    Number of children: Not on file    Years of education: Not on file    Highest education level: Not on file   Tobacco Use    Smoking status: Current Every Day Smoker    Smokeless tobacco: Never Used    Tobacco comment: 2-3 cigarettes daily   Substance and Sexual Activity    Alcohol use: Yes     Comment: socially    Drug use: Not Currently         Review of Systems:       General: Denies headache, lethargy, fever, weight loss  Ears/Nose/Throat: Denies ear discharge, drainage, nosebleeds, hoarse voice, dental problems  Cardiovascular: Denies chest pain, shortness of breath  Lungs: Denies chest pain, breathing problems, wheezing, pneumonia  Stomach: Denies stomach pain, heartburn, constipation, irritable bowel  Skin: Denies rash, sores, open wounds  Musculoskeletal: Admits to lateral hip pain, this pain is severe and causes difficulty walking. This pain is severe, made better by rest.   There is decreased mobility, and severe difficulty doing normal activities of daily living secondary to the pain. Genitourinary: Denies dysuria, hematuria, polyuria  Gastrointestinal: Denies constipation, obstipation, diarrhea  Neurological: Denies changes in sight, smell, hearing, taste, seizures. Denies loss of consciousness. Psychiatric: Denies depression, sleep pattern changes, anxiety, change in personality  Endocrine: Denies mood swings, heat or cold intolerance  Hematologic/Lymphatic: Denies anemia, purpura, petechia  Allergic/Immunologic: Denies swelling of throat, pain or swelling at lymph nodes      Physical Examination:    Visit Vitals  /57 (BP 1 Location: Left arm, BP Patient Position: Sitting)   Pulse 79   Temp 97.2 °F (36.2 °C) (Tympanic)   Ht 5' 2\" (1.575 m)   Wt 168 lb (76.2 kg)   SpO2 99%   BMI 30.73 kg/m²        General: AOX3, no apparent distress  Psychiatric: mood and affect appropriate  Lungs: breathing is symmetric and unlabored bilaterally  Heart: regular rate and rhythm  Abdomen: no guarding  Head: normocephalic, atraumatic  Skin: No significant abnormalities, good turgor  Sensation intact to light touch: L1-S1 dermatomes  Muscular exam: 5/5 strength in all major muscle groups unless noted in specialty exam.    Extremities      Left upper extremity: Full active and passive range of motion without pain, deformity, no open wound, strength 5/5 in all major muscle groups. Right upper extremity: Full active and passive range of motion without pain, deformity, no open wound, strength 5/5 in all major muscle groups. Left lower extremity: Full active and passive range of motion without pain, deformity, no open wound, strength 5/5 in all major muscle groups.     Right lower extremity:  No deformity is noted. Circumduction of the hip causes minimal pain in the groin. Palpation of the abductors as well as lateral aspect of the hip at the peritrochanteric bursa region is very painful and reproductive of the chief complaint. Range of motion is limited by pain, with a negative impingement sign. HERBERT test is negative. Gait is antalgic. Sensation is intact to light touch in the L1-S1 dermatomes. Skin is intact about the hip. Hip flexion strength is 5/5 and abduction strength is 5/5, hip extension and knee extension as well as tibialis anterior and EHL/GCS are 5/5 strength. Diagnostics:    Pertinent Xrays:  Pelvis and hip xrays indicate no fractures, dislocations, femoroacetabular joint is well mainatined. There are multple abductor muscle ossifications and  calcifcations at the tip of the greater trochanter at the insertion of the gluteus tendons      Assessment: Peritrochanteric pain syndrome, right hip    Plan: This patient and I did discuss at length the anatomy, which I drew out in office. We discussed the potential causes of the issue, as well as the treatment options, which are largely nonoperative. We discussed that a mixture of anti-inflammatory analgesics, cortisone injections, as well as consistent physical therapy for 4-6 weeks is the standard of care of this issue. Evidence indicates that over 90% of patients can begin to resolve their issues in that time. The patient will proceed with injections, PT, nsaids. The patient will then follow up in 4-6 weeks for a clinical check. Procedures: After consent was signed, the patient's right hip was prepped using a chlorhexidine scrub. Once  sterile, a timeout was performed and a 22 gauge needle was used to inject 5cc of 1% lidocaine through the subcutaneous tissues and iliotibial band in the peritrochanteric area nearest the apex of tenderness.   Once adequate anesthesia was confirmed, a mixture of 6mg of betamethasone and 5 cc of 1% lidocaine were injected below the iliotibial band in the same area. Needle was removed and a sterile dressing applied. Patient tolerated well without complication. Post injection instructions were given. Ms. Shannon Bolivar has a reminder for a \"due or due soon\" health maintenance. I have asked that she contact her primary care provider for follow-up on this health maintenance.

## 2020-08-12 NOTE — PROGRESS NOTES
Identified pt with two pt identifiers (name and ). Reviewed chart in preparation for visit and have obtained necessary documentation. Vera Judge is a 61 y.o. female  Chief Complaint   Patient presents with    Hip Pain     RT hip     Visit Vitals  /57 (BP 1 Location: Left arm, BP Patient Position: Sitting)   Pulse 79   Temp 97.2 °F (36.2 °C) (Tympanic)   Ht 5' 2\" (1.575 m)   Wt 168 lb (76.2 kg)   LMP  (LMP Unknown)   SpO2 99%   BMI 30.73 kg/m²     1. Have you been to the ER, urgent care clinic since your last visit? Hospitalized since your last visit? No    2. Have you seen or consulted any other health care providers outside of the [address] [place] [address] since your last visit? Include any pap smears or colon screening.  No

## 2020-08-12 NOTE — LETTER
8/12/2020 1:25 PM 
 
Ms. Ani Santamaria [address] [id number] To whom it may concern, 
 
 
 
 [facility]i [facility]amaria [facility]. Ani Santamaria will be unable to work beginning 8/12/20 for medical reasons. Anticipated return to work will be in 5 days with the following restrictions: none. If you have any questions or concerns, please feel free to contact my office.  
 
 
 
 
 
 
 
 
 
 
 
Sincerely, 
 
 
Wendi Jenkins DO

## 2020-08-18 ENCOUNTER — VIRTUAL VISIT (OUTPATIENT)
Dept: ORTHOPEDIC SURGERY | Age: 60
End: 2020-08-18
Payer: COMMERCIAL

## 2020-08-18 DIAGNOSIS — M70.61 TROCHANTERIC BURSITIS, RIGHT HIP: Primary | ICD-10-CM

## 2020-08-18 PROCEDURE — 99441 PR PHYS/QHP TELEPHONE EVALUATION 5-10 MIN: CPT | Performed by: ORTHOPAEDIC SURGERY

## 2020-08-18 NOTE — PROGRESS NOTES
8/18/2020      CC: Right hip pain    HPI:      This is a 61y.o. year old female who presents for follow up of their right hip peritrochanteric injection. The patient states that they have had good relief of their pain. The time since injection has been 1 week. PMH:  No past medical history on file. PSxHx:  No past surgical history on file. Meds:    Current Outpatient Medications:     diflunisaL (DOLOBID) 500 mg tab, Take 1 Tab by mouth two (2) times a day. Indications: chronic pain, Disp: 60 Tab, Rfl: 0    methocarbamoL (ROBAXIN) 750 mg tablet, Take 1 Tab by mouth four (4) times daily. , Disp: 20 Tab, Rfl: 0    All: Allergies   Allergen Reactions    Penicillins Swelling       Social Hx:  Social History     Socioeconomic History    Marital status: SINGLE     Spouse name: Not on file    Number of children: Not on file    Years of education: Not on file    Highest education level: Not on file   Tobacco Use    Smoking status: Current Every Day Smoker    Smokeless tobacco: Never Used    Tobacco comment: 2-3 cigarettes daily   Substance and Sexual Activity    Alcohol use: Yes     Comment: socially    Drug use: Not Currently       Family Hx:  No family history on file. Review of Systems:       General: Denies headache, lethargy, fever, weight loss  Ears/Nose/Throat: Denies ear discharge, drainage, nosebleeds, hoarse voice, dental problems  Cardiovascular: Denies chest pain, shortness of breath  Lungs: Denies chest pain, breathing problems, wheezing, pneumonia  Stomach: Denies stomach pain, heartburn, constipation, irritable bowel  Skin: Denies rash, sores, open wounds  Musculoskeletal: Mild right hip pain  Genitourinary: Denies dysuria, hematuria, polyuria  Gastrointestinal: Denies constipation, obstipation, diarrhea  Neurological: Denies changes in sight, smell, hearing, taste, seizures. Denies loss of consciousness.   Psychiatric: Denies depression, sleep pattern changes, anxiety, change in personality  Endocrine: Denies mood swings, heat or cold intolerance  Hematologic/Lymphatic: Denies anemia, purpura, petechia  Allergic/Immunologic: Denies swelling of throat, pain or swelling at lymph nodes      Physical Examination:    There were no vitals taken for this visit. General: AOX3, no apparent distress  Psychiatric: mood and affect appropriate        Diagnostics:    Pertinent Diagnostics: none    Assessment: Status post peritrochanteric injection  Plan: This patient and I discussed the normal course for injections, we discussed that pain relief will likely be temporary to some degree, but I cannot predict the longevity of relief. We also discussed the limitations of injections, and that I cannot inject the same area any more often than every three months. We will proceed with physical therapy, NSAIDs, follow-up in 1 month. No xrays. I was in the office while conducting this encounter. Consent:  She and/or her healthcare decision maker is aware that this patient-initiated Telehealth encounter is a billable service, with coverage as determined by her insurance carrier. She is aware that she may receive a bill and has provided verbal consent to proceed: Yes    This virtual visit was conducted telephone encounter only. -  I affirm this is a Patient Initiated Episode with an Established Patient who has not had a related appointment within my department in the past 7 days or scheduled within the next 24 hours. Note: this encounter is not billable if this call serves to triage the patient into an appointment for the relevant concern. Total Time: minutes: 5-10 minutes. Ms. Vince Choudhury has a reminder for a \"due or due soon\" health maintenance. I have asked that she contact her primary care provider for follow-up on this health maintenance.

## 2021-03-05 ENCOUNTER — APPOINTMENT (OUTPATIENT)
Dept: GENERAL RADIOLOGY | Age: 61
End: 2021-03-05
Attending: STUDENT IN AN ORGANIZED HEALTH CARE EDUCATION/TRAINING PROGRAM
Payer: COMMERCIAL

## 2021-03-05 ENCOUNTER — HOSPITAL ENCOUNTER (EMERGENCY)
Age: 61
Discharge: HOME | End: 2021-03-05
Attending: STUDENT IN AN ORGANIZED HEALTH CARE EDUCATION/TRAINING PROGRAM
Payer: COMMERCIAL

## 2021-03-05 VITALS
TEMPERATURE: 97.3 F | HEART RATE: 72 BPM | BODY MASS INDEX: 29.81 KG/M2 | SYSTOLIC BLOOD PRESSURE: 138 MMHG | RESPIRATION RATE: 20 BRPM | DIASTOLIC BLOOD PRESSURE: 73 MMHG | OXYGEN SATURATION: 100 % | HEIGHT: 62 IN | WEIGHT: 162 LBS

## 2021-03-05 DIAGNOSIS — M79.644 PAIN OF FINGER OF RIGHT HAND: Primary | ICD-10-CM

## 2021-03-05 PROCEDURE — 74011250636 HC RX REV CODE- 250/636: Performed by: STUDENT IN AN ORGANIZED HEALTH CARE EDUCATION/TRAINING PROGRAM

## 2021-03-05 PROCEDURE — 96372 THER/PROPH/DIAG INJ SC/IM: CPT

## 2021-03-05 PROCEDURE — 73140 X-RAY EXAM OF FINGER(S): CPT

## 2021-03-05 PROCEDURE — 99282 EMERGENCY DEPT VISIT SF MDM: CPT

## 2021-03-05 RX ORDER — KETOROLAC TROMETHAMINE 30 MG/ML
15 INJECTION, SOLUTION INTRAMUSCULAR; INTRAVENOUS ONCE
Status: COMPLETED | OUTPATIENT
Start: 2021-03-05 | End: 2021-03-05

## 2021-03-05 RX ORDER — ACYCLOVIR 50 MG/G
CREAM TOPICAL
Qty: 5 G | Refills: 0 | Status: SHIPPED | OUTPATIENT
Start: 2021-03-05

## 2021-03-05 RX ORDER — NAPROXEN 500 MG/1
500 TABLET ORAL 2 TIMES DAILY WITH MEALS
Qty: 20 TAB | Refills: 0 | Status: SHIPPED | OUTPATIENT
Start: 2021-03-05 | End: 2021-03-15

## 2021-03-05 RX ADMIN — KETOROLAC TROMETHAMINE 15 MG: 30 INJECTION, SOLUTION INTRAMUSCULAR; INTRAVENOUS at 15:32

## 2021-03-05 NOTE — ED PROVIDER NOTES
EMERGENCY DEPARTMENT HISTORY AND PHYSICAL EXAM      Date: 3/5/2021  Patient Name: Ramiro Sotelo    History of Presenting Illness     Chief Complaint   Patient presents with    Hand Pain         HPI: Ramiro Sotelo, 61 y.o. female presents to the ED with cc of hand pain. She has noted this over the last several days. She reports pain in her right index finger that radiates to her entire hand. She noted a small lesion on her distal index finger as well. 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 Does have a history of carpal tunnel. She has noted that her entire index finger has been numb compared to the others, now feels like she has some numbness in all of her fingertips. No neck or back pain, no swelling of the arm, no redness or swelling of the fingers or hand. No exposures that she knows of. There are no other complaints, changes, or physical findings at this time. PCP: Jack, Not On File    No current facility-administered medications on file prior to encounter. Current Outpatient Medications on File Prior to Encounter   Medication Sig Dispense Refill    diflunisaL (DOLOBID) 500 mg tab Take 1 Tab by mouth two (2) times a day. Indications: chronic pain 60 Tab 0    methocarbamoL (ROBAXIN) 750 mg tablet Take 1 Tab by mouth four (4) times daily. 20 Tab 0       Past History     Past Medical History:  History reviewed. No pertinent past medical history. Past Surgical History:  History reviewed. No pertinent surgical history. Family History:  History reviewed. No pertinent family history. Social History:  Social History     Tobacco Use    Smoking status: Current Every Day Smoker    Smokeless tobacco: Never Used    Tobacco comment: 2-3 cigarettes daily   Substance Use Topics    Alcohol use: Yes     Comment: socially    Drug use: Not Currently       Allergies:   Allergies   Allergen Reactions    Penicillins Swelling         Review of Systems   no fever  No eye pain  No ear pain  no shortness of breath  no chest pain  no abdominal pain    Physical Exam   Physical Exam  Constitutional:       General: She is not in acute distress. HENT:      Head: Normocephalic. Cardiovascular:      Rate and Rhythm: Normal rate and regular rhythm. Pulmonary:      Effort: Pulmonary effort is normal.      Breath sounds: Normal breath sounds. Abdominal:      Palpations: Abdomen is soft. Tenderness: There is no abdominal tenderness. Musculoskeletal:      Comments: Right hand with slight diffuse tenderness to palpation of the right distal index finger, there is a shallow, dry, yellowish ulceration over the ulnar aspect of the distal finger. There is no significant swelling of the digit, no erythema or warmth, she does have full range of motion at each joint, some discomfort with full flexion into a fist..  5 out of 5 strength with flexion extension of the MCP, PIP and DIP joint. She reports hyperesthesia of the finger, subjectively does not describe any numbness. No bony tenderness anywhere else over the digits or hand. Strong radial pulse, 5 out of 5 strength with wrist flexion and extension. No tenderness or swelling of the arm. No midline tenderness of the neck. Skin:     General: Skin is warm. Neurological:      General: No focal deficit present. Mental Status: She is alert and oriented to person, place, and time. Psychiatric:         Mood and Affect: Mood normal.         Diagnostic Study Results     Labs -   No results found for this or any previous visit (from the past 24 hour(s)). Radiologic Studies -   XR 2ND FINGER RT MIN 2 V    (Results Pending)     CT Results  (Last 48 hours)    None        CXR Results  (Last 48 hours)    None            Medical Decision Making   I am the first provider for this patient.     I reviewed the vital signs, available nursing notes, past medical history, past surgical history, family history and social history. Vital Signs-Reviewed the patient's vital signs. Patient Vitals for the past 24 hrs:   Temp Pulse Resp BP SpO2   03/05/21 1449 97.3 °F (36.3 °C) 72 20 138/73 100 %         Provider Notes (Medical Decision Making):   66-year-old female presenting with right index finger pain. Concern for possible finger strain, possible early cellulitis though no significant erythema or warmth, she does have a small shallow ulcer, differential includes herpetic shannon versus condyloma. No significant swelling, warmth or erythema, not consistent with pulpitis, no tenderness over the flexor tendon, no signs of tenosynovitis. She is neurovascularly intact. X-ray will be obtained to assess for any osseous abnormality, she is given Toradol IM. ED Course:     Initial assessment performed. The patients presenting problems have been discussed, and they are in agreement with the care plan formulated and outlined with them. I have encouraged them to ask questions as they arise throughout their visit. X-ray is unremarkable. She will be discharged with naproxen and acyclovir cream, will monitor closely, if there is any development of further redness, swelling, any purulence, she will return immediately, will follow closely with her primary care doctor. Disposition:  Home    PLAN:  1. Current Discharge Medication List        2.    Follow-up Information    None       Return to ED if worse     Diagnosis     Clinical Impression: Acute right index finger pain

## 2021-03-05 NOTE — ED NOTES
Patient here with c/o pain to her right hand. Patient states pain initially in her 2nd finger of right hand, states she noticed swelling but then states the other fingers started having pain and swelling today. Patient states she has had carpal tunnel in the past, however states that this feels different. Emergency Department Nursing Plan of Care       The Nursing Plan of Care is developed from the Nursing assessment and Emergency Department Attending provider initial evaluation. The plan of care may be reviewed in the ED Provider note.     The Plan of Care was developed with the following considerations:   Patient / Family readiness to learn indicated by:verbalized understanding  Persons(s) to be included in education: patient  Barriers to Learning/Limitations:No    Signed     Stacey Crawford RN    3/5/2021   3:47 PM

## 2021-03-05 NOTE — ED TRIAGE NOTES
Pain in right index finger x 2 days. Pt states she has carpal tunnel, has increased pain with redness, also has open area on right finger. No relief with ibuprofen.

## 2021-07-20 RX ORDER — DIFLUNISAL 500 MG/1
500 TABLET, FILM COATED ORAL 2 TIMES DAILY
Qty: 60 TABLET | Refills: 0 | OUTPATIENT
Start: 2021-07-20

## 2022-03-18 PROBLEM — M70.61 TROCHANTERIC BURSITIS, RIGHT HIP: Status: ACTIVE | Noted: 2020-08-12

## 2023-05-22 RX ORDER — DIFLUNISAL 500 MG/1
500 TABLET, FILM COATED ORAL 2 TIMES DAILY
COMMUNITY
Start: 2020-08-12

## 2023-05-22 RX ORDER — METHOCARBAMOL 750 MG/1
750 TABLET, FILM COATED ORAL 4 TIMES DAILY
COMMUNITY
Start: 2020-08-03

## 2023-05-22 RX ORDER — ACYCLOVIR 50 MG/G
CREAM TOPICAL
COMMUNITY
Start: 2021-03-05

## 2023-12-22 NOTE — LETTER
The patient is a 75-year-old female with neck pain.  The pain is mostly on the right side.  She has had cervical spinal pain for many years.  She underwent 2 cervical spine surgeries.  The surgeries were quite successful in addressing her radiating arm pain.  The neck pain persists.  The pain radiates into the back of her head.  The pain is affecting her sleep and her activities.  Her quality of life is unacceptable.    Review of Systems   Constitutional:  Positive for activity change.   Musculoskeletal:  Positive for neck pain and neck stiffness.   All other systems reviewed and are negative.    GENERAL: alert and appropriate, in no distress, well-hydrated, well-nourished and interactive  SKIN: no rash noted, surgical scars well healed  RESPIRATORY: breathing non-labored and no grunting/flaring/retractions  CHEST: equal chest rise with normal respiratory effort  ABDOMEN: soft and non-tender  BACK: back normal in appearance, spine with reduced ROM  EXTREMITIES: strength intact  NEUROLOGIC: gait antalgic, David sign negative, Spurling sign reproduced pain, sensation grossly intact.    Assessment and Plan    -Chronicity--chronic spinal pain    -Diagnostics--we reviewed the results of the plain films of the cervical spine from last year    -Pharmacologic--no change    -Psychologic--no need for psychologic intervention from my standpoint    -Physical--we discussed the importance of physical therapy and exercise.  We discussed avoidance and modification techniques.    -Intervention--the patient is a candidate for diagnostic facet medial nerve branch blocks bilaterally at the C3 and C4 levels.  I explained the risk benefits alternatives and diagnostic nature of the procedure to the patient.  The patient wishes to proceed.    I spent time educating the patient on the condition including the treatment and the prognosis.  I invited the patient to call at anytime with any questions.     [facility] EMERGENCY DEPT 
[address] [id number] 
[phone] Work/School Note Date: 8/3/2020 To Whom It May concern: 
 
Armaan Serrano was seen and treated today in the emergency room by the following: Jory Coleman MD 
 
Armaan Serrano may return to work on 08/07/2020.  
 
Sincerely, 
 
 
 
 
Mady Ren MD